# Patient Record
Sex: MALE | Race: WHITE | URBAN - METROPOLITAN AREA
[De-identification: names, ages, dates, MRNs, and addresses within clinical notes are randomized per-mention and may not be internally consistent; named-entity substitution may affect disease eponyms.]

---

## 2020-01-09 ENCOUNTER — OFFICE VISIT (OUTPATIENT)
Dept: ORTHOPEDIC SURGERY | Age: 35
End: 2020-01-09
Payer: MEDICARE

## 2020-01-09 VITALS
HEART RATE: 94 BPM | SYSTOLIC BLOOD PRESSURE: 145 MMHG | DIASTOLIC BLOOD PRESSURE: 95 MMHG | WEIGHT: 264.3 LBS | BODY MASS INDEX: 37.84 KG/M2 | HEIGHT: 70 IN

## 2020-01-09 PROCEDURE — G8427 DOCREV CUR MEDS BY ELIG CLIN: HCPCS | Performed by: FAMILY MEDICINE

## 2020-01-09 PROCEDURE — G8484 FLU IMMUNIZE NO ADMIN: HCPCS | Performed by: FAMILY MEDICINE

## 2020-01-09 PROCEDURE — 99203 OFFICE O/P NEW LOW 30 MIN: CPT | Performed by: FAMILY MEDICINE

## 2020-01-09 PROCEDURE — G8417 CALC BMI ABV UP PARAM F/U: HCPCS | Performed by: FAMILY MEDICINE

## 2020-01-09 PROCEDURE — 1036F TOBACCO NON-USER: CPT | Performed by: FAMILY MEDICINE

## 2020-01-09 SDOH — HEALTH STABILITY: MENTAL HEALTH: HOW OFTEN DO YOU HAVE A DRINK CONTAINING ALCOHOL?: NEVER

## 2020-01-16 ENCOUNTER — HOSPITAL ENCOUNTER (OUTPATIENT)
Dept: PHYSICAL THERAPY | Facility: CLINIC | Age: 35
Setting detail: THERAPIES SERIES
Discharge: HOME | End: 2020-01-16
Payer: MEDICARE

## 2020-01-16 PROCEDURE — 97110 THERAPEUTIC EXERCISES: CPT

## 2020-01-16 PROCEDURE — 97016 VASOPNEUMATIC DEVICE THERAPY: CPT

## 2020-01-16 PROCEDURE — 97161 PT EVAL LOW COMPLEX 20 MIN: CPT

## 2020-01-16 NOTE — CONSULTS
[x] [facility]  Outpatient Rehabilitation &  Therapy  [facility]   [place]: [phone]   F: [phone]      Physical Therapy Lower Extremity Evaluation    Date:  2020  Patient: Hu Hubbard  : 1985  MRN: [id number]  Physician: Dr Kamla Matthews DO  Insurance: [employer] Advantage (30 v/year)   Medical Diagnosis: RLE PFS Knee  Rehab Codes: M25.561  Onset date:     Next 's appt.:      Subjective:   CC/HPI: Pt with RLE knee pain, felt hypermobility in the knee  while standing, pain. First pursued care in 2018, Dr Loretta Johnston. Injection and PT at that time. Pain has recently returned, saw Dr Jerson Amro, XR (-) RLE knee and sent to PT. PMHx: [] Unremarkable [] Diabetes [] HTN  [] Pacemaker   [] MI/Heart Problems [] Cancer [] Arthritis   [] Other:              [x] Refer to full medical chart  In EPIC     Tests: [] X-Ray:    [] MRI:    [] Other:     Medications:  [x] Refer to full medical record [] None [] Other:  Allergies:        [x] Refer to full medical record [] None [] Other:        Home type 1 story condo    Stairs from outside -   Stairs inside -   [employer]    Job status Part-time    Work Activities/duties  Standing, carrying, step stool    Recreational Activities Professional Wrestling-locally        Pain present? yes   Location RLE medial and inf patella   Pain Rating currently 1/10   Pain at worse 6/10   Pain at best 0/10   Description of pain Stiffness, tightness, sharp at times   Altered Sensation none   What makes it worse Kneeling, squatting, bending, ladder    What makes it better Rest   Symptom progression Same    Sleep Ok               Objective:    ROM  ° A/P STRENGTH    Left Right Left Right   Hip Flex       Ext   4 4-   ER       IR       ABD   4 4-   ADD       Knee Flex  120     Ext  -10     Ankle DF       PF       INV       EVER                  TESTS (+/-) Left Right Not Tested   Ant.  Drawer   []   Post. Drawer   []   Lachmans   []   Valgus limitations throughout to equal bilat to reduce difficulty with ADLs  3. ? Strength: Increase LE strength to 5/5 MMT   4. Independent with Home Exercise Programs      LTG: (to be met in 20 treatments)  1. Improve score on assessment tool LEFS   2. Reduce pain levels to 0/10                   Patient goals:    Rehab Potential:  [x] Good  [] Fair  [] Poor   Suggested Professional Referral:  [x] No  [] Yes:  Barriers to Goal Achievement[de-identified]  [x] No  [] Yes:  Domestic Concerns:  [x] No  [] Yes:    Pt. Education:  [x] Plans/Goals, Risks/Benefits discussed  [x] Home exercise program    Method of Education: [x] Verbal  [x] Demo  [] Written  Comprehension of Education:  [x] Verbalizes understanding. [x] Demonstrates understanding. [x] Needs Review. [] Demonstrates/verbalizes understanding of HEP/Ed previously given. Treatment Plan:  [x] Therapeutic Exercise    [] Aquatic Therapy   [x] Manual Therapy     [] Electrical Stimulation  [x] Instruction in HEP      [] Lumbar/Cervical Traction  [x] Neuromuscular Re-education [] Cold/hotpack  [] Iontophoresis: 4 mg/mL  [x] Vasocompression (GameReady)                    Dexamethasone Sodium  [] Gait Training             Phosphate 40-80 mAmin         []  Medication allergies reviewed for use of    Dexamethasone Sodium Phosphate 4mg/ml     with iontophoresis treatments. Pt is not allergic.     Frequency:  1-2 x/week for 20 visits    Todays Treatment:    Exercises:  Exercise    RLE PFS Reps/ Time Weight/ Level Comments         Bike            *HS Belt S      *Calf S      *SL Hip Abd      *Clamshells      Prone hip ext      Flying squirrels      Quad Hip Abd      Quad Hip Ext            TGym Squats      TGym HR      Balance board      SLS Rebounder                        Other:  Vaso Gameready 15' mild pressure        Specific Instructions for next treatment: Advance ex's     Evaluation Complexity:  History (Personal factors, comorbidities) [x] 0 [] 1-2 [] 3+   Exam (limitations, restrictions) [x] 1-2 [] 3 [] 4+   Clinical presentation (progression) [x] Stable [] Evolving  [] Unstable   Decision Making [x] Low [] Moderate [] High    [x] Low Complexity [] Moderate Complexity [] High Complexity       Treatment Charges: Mins Units   [x] Evaluation       [x]  Low       []  Moderate       []  High 30 1   []  Modalities     [x]  Ther Exercise 10 1   []  Manual Therapy     []  Ther Activities     []  Aquatics     [x]  Vasocompression 15 1   []  Other       TOTAL TREATMENT TIME:55    Time in:1305   Time Out:1400    Electronically signed by: Kathy Ortiz PT        Physician Signature:________________________________Date:__________________  By signing above or cosigning this note, I have reviewed this plan of care and certify a need for medically necessary rehabilitation services.      *PLEASE SIGN ABOVE AND FAX BACK ALL PAGES*

## 2020-01-21 ENCOUNTER — HOSPITAL ENCOUNTER (OUTPATIENT)
Dept: PHYSICAL THERAPY | Facility: CLINIC | Age: 35
Setting detail: THERAPIES SERIES
Discharge: HOME | End: 2020-01-21
Payer: MEDICARE

## 2020-01-23 ENCOUNTER — HOSPITAL ENCOUNTER (OUTPATIENT)
Dept: PHYSICAL THERAPY | Facility: CLINIC | Age: 35
Setting detail: THERAPIES SERIES
Discharge: HOME | End: 2020-01-23
Payer: MEDICARE

## 2020-01-23 PROCEDURE — 97140 MANUAL THERAPY 1/> REGIONS: CPT

## 2020-01-23 PROCEDURE — 97110 THERAPEUTIC EXERCISES: CPT

## 2020-01-23 PROCEDURE — 97016 VASOPNEUMATIC DEVICE THERAPY: CPT

## 2020-01-23 NOTE — FLOWSHEET NOTE
treatment:    Treatment Charges: Mins Units   []  Modalities     [x]  Ther Exercise 25 2   [x]  Manual Therapy 10 1   []  Ther Activities     []  Aquatics     [x]  Vasocompression 15 1   []  Other     Total Treatment time 50 4       Assessment: [] Progressing toward goals. [] No change. [] Other: Pt notes increased tightness in gastroc when stretching causing decreased motion in ankle, therefore, applied Hypervolt, pt notes sig improvement. Pt demos increased weakness in bilateral hips when reviewing HEP requiring increased rest breaks throughout, resulting in inability to complete all exercises listed. Ended with vaso to decrease swelling and pain in knee. Educated pt to continue HEP 2x a day, pt with good understanding. STG/LTG  STG: (to be met in 10 treatments)  1. ? Pain: Decrease pain levels to 2/10 with ADLs  2. ? ROM: Increase flexibility and AROM limitations throughout to equal bilat to reduce difficulty with ADLs  3. ? Strength: Increase LE strength to 5/5 MMT   4. Independent with Home Exercise Programs        LTG: (to be met in 20 treatments)  1. Improve score on assessment tool LEFS   2. Reduce pain levels to 0/10                    Patient goals:     Rehab Potential:  [x]? Good  []? Fair  []? Poor    Suggested Professional Referral:  [x]? No  []? Yes:  Barriers to Goal Achievement[de-identified]  [x]? No  []? Yes:  Domestic Concerns:  [x]? No  []? Yes:    Pt. Education:  [] Yes  [] No  [] Reviewed Prior HEP/Ed  Method of Education: [] Verbal  [] Demo  [] Written  Comprehension of Education:  [] Verbalizes understanding. [] Demonstrates understanding. [] Needs review. [] Demonstrates/verbalizes HEP/Ed previously given. Plan: [] Continue per plan of care.    [] Other:      Time In:1:10pm              Time Out:2:10pm     Electronically signed by:  Radhika Downing PTA

## 2020-01-27 ENCOUNTER — HOSPITAL ENCOUNTER (OUTPATIENT)
Dept: PHYSICAL THERAPY | Facility: CLINIC | Age: 35
Setting detail: THERAPIES SERIES
Discharge: HOME | End: 2020-01-27
Payer: MEDICARE

## 2020-01-27 PROCEDURE — 97110 THERAPEUTIC EXERCISES: CPT

## 2020-01-27 PROCEDURE — 97016 VASOPNEUMATIC DEVICE THERAPY: CPT

## 2020-01-27 PROCEDURE — 97140 MANUAL THERAPY 1/> REGIONS: CPT

## 2020-01-27 NOTE — FLOWSHEET NOTE
[] [facility]) [facility] &  Therapy  [address]  [phone]  F: [phone] [] [address]  [place] [address]   [address]  P: [phone]  F: [phone] [] [address] &  Therapy  [address]  P: [phone]  F: [phone] [x] [address]  [facility]   [address]   [place]: [phone]  F: [phone]      Physical Therapy Daily Treatment Note    Date:  2020  Patient Name:  Lily Bynum"   :  1985  MRN: [id number]  Physician: Dr Jaqueline Vila DO                   Insurance: [employer] (30 v/year)   Medical Diagnosis: RLE PFS Knee             Rehab Codes: M25.561  Onset date:                                            Next 's appt. :    Visit# / total visits: 3/20              Cancels/No Shows:     Subjective:    Pain:  [] Yes  [] No Location: RLE  Pain Rating: (0-10 scale) 0/10  Pain altered Tx:  [] No  [] Yes  Action:  Comments: Pt reported no pain this date.       Objective:  Modalities:   Precautions:  Exercises:  Exercise     RLE PFS Reps/ Time Weight/ Level Comments             Bike  10'    Pt notes it hurts his bottom   SB S 3x30\"               *HS Belt S 3x30\"       *Long sitting Calf S 3x30\"       *SL Hip Abd  2x10       *Clamshells  2x10       Prone hip ext  2x10    N/T   Flying squirrels  2x10    N/T   Quad Hip Abd  2x10    N/T   Quad Hip Ext  2x10    N/T             TGym Squats      TGym HR      Balance board  L2 5 min      SLS Rebounder  10x ea   3 way                                 Other:   Vaso Gameready 15' low pressure 34 degrees R knee    Manual: hypervolt with focus on gastroc and hamstrings for RLE     Specific Instructions for next treatment: Advance ex's         Specific Instructions for next treatment:    Treatment Charges: Mins Units   [] Modalities     [x]  Ther Exercise 37 2   [x]  Manual Therapy 10 1   []  Ther Activities     []  Aquatics     [x]  Vasocompression 15 1   []  Other     Total Treatment time 62 4       Assessment: [x] Progressing toward goals. [] No change. [x] Other: Pt accomplished all activities as listed above with pain reported in the R heal, pt able to complete all reps. Pt received manual therapy as noted above with use of hypervolt on gastroc and hamstring for RLE. Added balance board and rebounder to increase LE stability and strength. Pt concluded session with vasocompression for R knee to decrease soreness and swelling. STG: (to be met in 10 treatments)  1. ? Pain: Decrease pain levels to 2/10 with ADLs  2. ? ROM: Increase flexibility and AROM limitations throughout to equal bilat to reduce difficulty with ADLs  3. ? Strength: Increase LE strength to 5/5 MMT   4. Independent with Home Exercise Programs        LTG: (to be met in 20 treatments)  1. Improve score on assessment tool LEFS   2. Reduce pain levels to 0/10                    Patient goals:     Rehab Potential:  [x]? Good  []? Fair  []? Poor    Suggested Professional Referral:  [x]? No  []? Yes:  Barriers to Goal Achievement[de-identified]  [x]? No  []? Yes:  Domestic Concerns:  [x]? No  []? Yes:    Pt. Education:  [x] Yes  [] No  [] Reviewed Prior HEP/Ed  Method of Education: [x] Verbal  [] Demo  [] Written  Comprehension of Education:  [x] Verbalizes understanding. [] Demonstrates understanding. [x] Needs review. [] Demonstrates/verbalizes HEP/Ed previously given. Plan: [x] Continue per plan of care.    [] Other:      Time In: 8773              Time NLR:4035     Electronically signed by:  Karla Lane PTA

## 2020-01-29 ENCOUNTER — HOSPITAL ENCOUNTER (OUTPATIENT)
Dept: PHYSICAL THERAPY | Facility: CLINIC | Age: 35
Setting detail: THERAPIES SERIES
Discharge: HOME | End: 2020-01-29
Payer: MEDICARE

## 2020-01-29 PROCEDURE — 97110 THERAPEUTIC EXERCISES: CPT

## 2020-01-29 NOTE — FLOWSHEET NOTE
[x]  Ther Exercise 40 3   [x]  Manual Therapy N/T 0   []  Ther Activities     []  Aquatics     [x]  Vasocompression N/T 0   []  Other     Total Treatment time 40 3       Assessment: [x] Progressing toward goals. [] No change. [x] Other: Focused on standing exercises, pt demos weakness in sivan hips/quads with all exercises requiring frequent rest breaks. Pt notes soreness post exercises. Encouraged pt to continue HEP, pt with good understanding and notes he is still doing them. Will continue to monitor symptoms and progress as able. STG: (to be met in 10 treatments)  1. ? Pain: Decrease pain levels to 2/10 with ADLs  2. ? ROM: Increase flexibility and AROM limitations throughout to equal bilat to reduce difficulty with ADLs  3. ? Strength: Increase LE strength to 5/5 MMT   4. Independent with Home Exercise Programs        LTG: (to be met in 20 treatments)  1. Improve score on assessment tool LEFS   2. Reduce pain levels to 0/10                    Patient goals:     Rehab Potential:  [x]? Good  []? Fair  []? Poor    Suggested Professional Referral:  [x]? No  []? Yes:  Barriers to Goal Achievement[de-identified]  [x]? No  []? Yes:  Domestic Concerns:  [x]? No  []? Yes:    Pt. Education:  [x] Yes  [] No  [] Reviewed Prior HEP/Ed  Method of Education: [x] Verbal  [] Demo  [] Written  Comprehension of Education:  [x] Verbalizes understanding. [] Demonstrates understanding. [x] Needs review. [] Demonstrates/verbalizes HEP/Ed previously given. Plan: [x] Continue per plan of care.    [] Other:      Time In: 12:52pm   Time Out:1:55pm    Electronically signed by:  Chuy Dickinson PTA

## 2020-02-04 ENCOUNTER — HOSPITAL ENCOUNTER (OUTPATIENT)
Dept: PHYSICAL THERAPY | Facility: CLINIC | Age: 35
Setting detail: THERAPIES SERIES
Discharge: HOME | End: 2020-02-04
Payer: MEDICARE

## 2020-02-04 PROCEDURE — 97110 THERAPEUTIC EXERCISES: CPT

## 2020-02-04 PROCEDURE — 97016 VASOPNEUMATIC DEVICE THERAPY: CPT

## 2020-02-04 NOTE — FLOWSHEET NOTE
next treatment:    Treatment Charges: Mins Units   []  Modalities     [x]  Ther Exercise 35 2   [x]  Manual Therapy N/T 0   []  Ther Activities     []  Aquatics     [x]  Vasocompression 15 1   []  Other     Total Treatment time 50 3       Assessment: [x] Progressing toward goals. [] No change. [x] Other: Pt performed all exercises with no pain in knee, pt notes fatigue in hips. Administered t-band to begin 4-way hip as HEP, pt with good understanding to perform 2x daily. Discussed pt attending gym per pt preference, gave pt permission, however, educated pt to not lift extreme weight and to stick with non-impact cardio machines, pt with good understanding. Will continue to monitor symptoms and progress as able. STG: (to be met in 10 treatments)  1. ? Pain: Decrease pain levels to 2/10 with ADLs  2. ? ROM: Increase flexibility and AROM limitations throughout to equal bilat to reduce difficulty with ADLs  3. ? Strength: Increase LE strength to 5/5 MMT   4. Independent with Home Exercise Programs        LTG: (to be met in 20 treatments)  1. Improve score on assessment tool LEFS   2. Reduce pain levels to 0/10                    Patient goals:     Rehab Potential:  [x]? Good  []? Fair  []? Poor    Suggested Professional Referral:  [x]? No  []? Yes:  Barriers to Goal Achievement[de-identified]  [x]? No  []? Yes:  Domestic Concerns:  [x]? No  []? Yes:    Pt. Education:  [x] Yes  [] No  [] Reviewed Prior HEP/Ed  Method of Education: [x] Verbal  [] Demo  [] Written  Comprehension of Education:  [x] Verbalizes understanding. [] Demonstrates understanding. [x] Needs review. [] Demonstrates/verbalizes HEP/Ed previously given. Plan: [x] Continue per plan of care.    [] Other:      Time In: 12:50pm   Time Out:1:55pm    Electronically signed by:  Shanika Gomez PTA

## 2020-02-07 ENCOUNTER — HOSPITAL ENCOUNTER (OUTPATIENT)
Dept: PHYSICAL THERAPY | Facility: CLINIC | Age: 35
Setting detail: THERAPIES SERIES
Discharge: HOME | End: 2020-02-07
Payer: MEDICARE